# Patient Record
Sex: MALE
[De-identification: names, ages, dates, MRNs, and addresses within clinical notes are randomized per-mention and may not be internally consistent; named-entity substitution may affect disease eponyms.]

---

## 2023-07-17 ENCOUNTER — NON-APPOINTMENT (OUTPATIENT)
Age: 43
End: 2023-07-17

## 2023-07-19 ENCOUNTER — APPOINTMENT (OUTPATIENT)
Dept: DERMATOLOGY | Facility: CLINIC | Age: 43
End: 2023-07-19
Payer: COMMERCIAL

## 2023-07-19 VITALS — HEIGHT: 67 IN | BODY MASS INDEX: 30.29 KG/M2 | WEIGHT: 193 LBS

## 2023-07-19 DIAGNOSIS — D48.5 NEOPLASM OF UNCERTAIN BEHAVIOR OF SKIN: ICD-10-CM

## 2023-07-19 PROBLEM — Z00.00 ENCOUNTER FOR PREVENTIVE HEALTH EXAMINATION: Status: ACTIVE | Noted: 2023-07-19

## 2023-07-19 PROCEDURE — 99204 OFFICE O/P NEW MOD 45 MIN: CPT | Mod: 25

## 2023-07-19 PROCEDURE — 11105 PUNCH BX SKIN EA SEP/ADDL: CPT | Mod: 59

## 2023-07-19 PROCEDURE — 11104 PUNCH BX SKIN SINGLE LESION: CPT

## 2023-07-19 RX ORDER — TRIAMCINOLONE ACETONIDE 1 MG/G
0.1 OINTMENT TOPICAL
Qty: 1 | Refills: 1 | Status: ACTIVE | COMMUNITY
Start: 2023-07-19 | End: 1900-01-01

## 2023-07-19 NOTE — ASSESSMENT
[FreeTextEntry1] : #Dermatitis, new diagnosis with uncertain prognosis \par Ddx: lichen planus vs. other \par Reviewed risks (as well as mitigation strategies for adverse drug events as applicable), benefits, and alternatives of therapy \par - Start triamcinolone 0.1% bid to AA prn itchy rash, side effects discussed. Avoid use on face/groin. Use for up to 2 weeks on and 1 week off. Cycle the medication as needed for flare ups. \par - r/b/a topical steroids were discussed including but not limited to thinning of the skin, atrophy and dyspigmentation. \par - biopsy as below \par \par # Neoplasm of uncertain behavior x2, R axilla and R upper arm \par ddx: lichen planus vs. other \par - Recommend skin biopsy to help confirm diagnosis. See procedure note below\par PROCEDURE:  Punch Biopsy\par METHOD:  Punch\par DESCRIPTION OF PROCEDURE:  After informed consent was obtained, including a discussion of the risk for bleeding, infection, incomplete removal, scarring, and recurrence/persistence, the lesional area was prepped with hibiclens prior to infiltration with 1% lidocaine with epinephrine, 1:100,000 x 1 ml.  After waiting several minutes for epinephrine effect and that adequate anesthesia was achieved, the lesion was biopsied with a 3 mm punch.  Hemostasis was obtained.  There were no complications.  The biopsy was placed in formalin and sent for routine histopathological evaluation. 4-0 prolene sutures were used. Sutures will be removed in 10-14 days. The wound was then dressed with sterile petrolatum and a sterile dressing.  Wound care instructions were provided in verbal and written form including a 24-hour contact number in case of emergency. The patient will be notified by one of the clinical staff (upon return to clinic or by phone) regarding the biopsy results and the need for further treatment.\par \par RTC 2 weeks for suture removal

## 2023-07-19 NOTE — PHYSICAL EXAM
[Alert] : alert [Oriented x 3] : ~L oriented x 3 [Well Nourished] : well nourished [Conjunctiva Non-injected] : conjunctiva non-injected [No Visual Lymphadenopathy] : no visual  lymphadenopathy [No Clubbing] : no clubbing [No Edema] : no edema [No Bromhidrosis] : no bromhidrosis [No Chromhidrosis] : no chromhidrosis [FreeTextEntry3] : Purple scaly papules over axilla, arms, palms bilaterally, and lower back

## 2023-07-19 NOTE — HISTORY OF PRESENT ILLNESS
[FreeTextEntry1] : NV rash [de-identified] : DELONTE MARES is a 42 year old male presenting for:\par \par 1. painful rash started over the axilla and spread to the arms and hands for the past month. Not using treatment. No recent illnesses, new products/fragrances, or travel. No sick contacts. \par \par Derm Hx: no hx skin cancer \par Family Hx: no hx skin cancer \par \par No medical conditions and not on any medications.

## 2023-07-26 ENCOUNTER — NON-APPOINTMENT (OUTPATIENT)
Age: 43
End: 2023-07-26

## 2023-07-26 LAB — DERMATOLOGY BIOPSY: NORMAL

## 2023-08-02 ENCOUNTER — APPOINTMENT (OUTPATIENT)
Dept: DERMATOLOGY | Facility: CLINIC | Age: 43
End: 2023-08-02
Payer: COMMERCIAL

## 2023-08-02 PROCEDURE — 99212 OFFICE O/P EST SF 10 MIN: CPT

## 2023-08-13 NOTE — HISTORY OF PRESENT ILLNESS
[FreeTextEntry1] : F/u LP [de-identified] : 42-year-old male presenting today after biopsies on 7/19/2023 at the right upper arm and right axilla, consistent with lichen planus. Patient denies pain, pruritus, or drainage from biopsy sites.  Started Prednisone 60 mg PO daily x 1 week on 7/26. He has 1 day left of 60 mg. Remainder of taper includes Prednisone 40 mg qAM x 1 week followed by Prednisone 20 mg qAM x 1 week.  He has been taking 20 mg tablets TID- breakfast, lunch, and dinner. Reports insomnia over the past week. Rash is improving. No oral involvement. No pain, no pruritus.    History  7/19/2023: painful rash started over the axilla and spread to the arms and hands for the past month. Not using treatment. No recent illnesses, new products/fragrances, or travel. No sick contacts.   Derm Hx: no hx skin cancer  Family Hx: no hx skin cancer   No medical conditions and not on any medications.

## 2023-08-13 NOTE — PHYSICAL EXAM
[Alert] : alert [Oriented x 3] : ~L oriented x 3 [Well Nourished] : well nourished [Conjunctiva Non-injected] : conjunctiva non-injected [No Visual Lymphadenopathy] : no visual  lymphadenopathy [No Clubbing] : no clubbing [No Edema] : no edema [No Bromhidrosis] : no bromhidrosis [No Chromhidrosis] : no chromhidrosis [FreeTextEntry3] : Focused physical exam with relevant aspects of exam performed today. Brown, hyperpigmented scaly macules in various stages of healing over axilla, arms, palms, and lower back.   Well-healed scars at right bicep and right axilla without erythema, warmth, or dehiscence.  Non-tender to palpation. No drainage.

## 2023-08-13 NOTE — ASSESSMENT
[FreeTextEntry1] : 1. Lichen planus, flaring at axillae, arms, palms, lower back Improving with Prednisone taper and triamcinolone 0.1% ointment.  S/p punch biopsies at R axilla and R upper arm  - Education, counseling. - Continue Prednisone taper as prescribed by Dr. Fuentes but recommend Patient take all Prednisone tablets qAM instead of TID. Side effects of Prednisone discussed.  C/w remaining two days of Prednisone 60 mg followed by Prednisone 40 mg qAM x 1 week followed by Prednisone 20 mg qAM x 1 week.  - C/w triamcinolone 0.1% bid to AA prn itchy rash, side effects discussed. Avoid use on face/groin. SED including atrophy, dyspigmentation, telangiectasias, striae. Proper use reviewed including only using to affected area and avoidance of prolonged use.  2. Suture removal There are no signs or symptoms of infection or dehiscence. There is no evidence of bleeding or hematoma formation. Sutures were removed. Pathology was reviewed with patient and patient was given wound care instructions.  RTC PRN to see Dr. Fuentes.

## 2023-09-13 ENCOUNTER — APPOINTMENT (OUTPATIENT)
Dept: DERMATOLOGY | Facility: CLINIC | Age: 43
End: 2023-09-13
Payer: COMMERCIAL

## 2023-09-13 DIAGNOSIS — D48.5 NEOPLASM OF UNCERTAIN BEHAVIOR OF SKIN: ICD-10-CM

## 2023-09-13 PROCEDURE — 96372 THER/PROPH/DIAG INJ SC/IM: CPT

## 2023-09-13 PROCEDURE — 99214 OFFICE O/P EST MOD 30 MIN: CPT | Mod: 25

## 2023-10-14 ENCOUNTER — NON-APPOINTMENT (OUTPATIENT)
Age: 43
End: 2023-10-14

## 2023-10-16 ENCOUNTER — NON-APPOINTMENT (OUTPATIENT)
Age: 43
End: 2023-10-16

## 2023-11-08 ENCOUNTER — APPOINTMENT (OUTPATIENT)
Dept: DERMATOLOGY | Facility: CLINIC | Age: 43
End: 2023-11-08
Payer: COMMERCIAL

## 2023-11-08 PROCEDURE — 96401 CHEMO ANTI-NEOPL SQ/IM: CPT

## 2023-11-08 PROCEDURE — 99214 OFFICE O/P EST MOD 30 MIN: CPT | Mod: 25

## 2023-12-21 ENCOUNTER — APPOINTMENT (OUTPATIENT)
Dept: DERMATOLOGY | Facility: CLINIC | Age: 43
End: 2023-12-21
Payer: COMMERCIAL

## 2023-12-21 DIAGNOSIS — L29.9 PRURITUS, UNSPECIFIED: ICD-10-CM

## 2023-12-21 PROCEDURE — 99214 OFFICE O/P EST MOD 30 MIN: CPT

## 2023-12-21 RX ORDER — DUPILUMAB 300 MG/2ML
300 INJECTION, SOLUTION SUBCUTANEOUS
Qty: 1 | Refills: 5 | Status: ACTIVE | COMMUNITY
Start: 2023-11-08 | End: 1900-01-01

## 2023-12-21 RX ORDER — PREDNISONE 20 MG/1
20 TABLET ORAL
Qty: 42 | Refills: 0 | Status: ACTIVE | COMMUNITY
Start: 2023-07-26 | End: 1900-01-01

## 2023-12-21 NOTE — PHYSICAL EXAM
[FreeTextEntry3] : AAOx3, pleasant, NAD, no visual lymphadenopathy hair, scalp, face, nose, eyelids, ears, lips, oropharynx, neck, chest, abdomen, back, right arm, left arm, nails, and hands examined with all normal findings, pertinent findings include:  purple papules on wrist, back, legs; diffuse xerosis 40% BSA flaring from prior visit

## 2023-12-21 NOTE — ASSESSMENT
[FreeTextEntry1] : atopic derm, lichen planus failed topical triamcinolone, betamethasone, tacrolimus; SED flaring today  will repeat prednisone 3 week taper, 60 mg x1 week, 40 mg x1 week, 20 mg x1 week; SED c/w Dupixent  F/U in 6 weeks and determine best next course whether to c/w Dupixent or switch to other treatment. SED of Dupixent May also consider Apremilast vs phototherapy in future  unable to continue with prednisone given recurrent courses required and weaning response  f/u 6 weeks  SCORAD 58.75

## 2023-12-21 NOTE — HISTORY OF PRESENT ILLNESS
[FreeTextEntry1] : f/u lichen planus, eczema [de-identified] : 43  year old female/ f/u lichen planus and atopic derm. has done well with prednisone in past then flared when stopped.. LV 11/8 started dupixent loading and has continued 300mg every other week.  Endorses not noting any improvement with dupixent, has had 2 doses of maintenance thus far  Not currently using any topicals

## 2024-01-24 ENCOUNTER — APPOINTMENT (OUTPATIENT)
Dept: DERMATOLOGY | Facility: CLINIC | Age: 44
End: 2024-01-24
Payer: COMMERCIAL

## 2024-01-24 PROCEDURE — 99214 OFFICE O/P EST MOD 30 MIN: CPT

## 2024-01-24 NOTE — ASSESSMENT
[FreeTextEntry1] : atopic derm, lichen planus failed topical betamethasone, tacrolimus; SED not at goal c/w Dupixent  F/U in 6 weeks and determine best next course whether to c/w Dupixent or switch to other treatment. SED of Dupixent May also consider Apremilast vs phototherapy in future  unable to continue with prednisone given recurrent courses required and weaning response  add TAC BID PRN; SED  f/u 6 weeks  SCORAD 58.75

## 2024-01-24 NOTE — HISTORY OF PRESENT ILLNESS
[FreeTextEntry1] : f/u lichen planus, eczema [de-identified] : 43  year old female/ f/u lichen planus and atopic derm. has done well with prednisone in past then flared when stopped.. LV 11/8 started dupixent loading and has continued 300mg every other week.  Endorses improvement with dupixent,  Not currently using any topicals

## 2024-03-07 ENCOUNTER — APPOINTMENT (OUTPATIENT)
Dept: DERMATOLOGY | Facility: CLINIC | Age: 44
End: 2024-03-07
Payer: COMMERCIAL

## 2024-03-07 DIAGNOSIS — L81.0 POSTINFLAMMATORY HYPERPIGMENTATION: ICD-10-CM

## 2024-03-07 PROCEDURE — 99214 OFFICE O/P EST MOD 30 MIN: CPT

## 2024-03-07 RX ORDER — TRIAMCINOLONE ACETONIDE 1 MG/G
0.1 CREAM TOPICAL TWICE DAILY
Qty: 1 | Refills: 2 | Status: ACTIVE | COMMUNITY
Start: 2024-01-24 | End: 1900-01-01

## 2024-03-07 NOTE — HISTORY OF PRESENT ILLNESS
[FreeTextEntry1] : f/u lichen planus, eczema [de-identified] : 43-year-old male f/u lichen planus and atopic derm. has done well with prednisone in past then flared when stopped. LV 11/8 started dupixent, continued until last visit. Last dupixent dose mid-Jan 2024.

## 2024-03-07 NOTE — PHYSICAL EXAM
[FreeTextEntry3] : AAOx3, pleasant, NAD, no visual lymphadenopathy hair, scalp, face, nose, eyelids, ears, lips, oropharynx, neck, chest, abdomen, back, right arm, left arm, nails, and hands examined with all normal findings, pertinent findings include:  Purple and brown hyperpigmented smooth macules on the wrist, back, legs

## 2024-05-22 ENCOUNTER — APPOINTMENT (OUTPATIENT)
Dept: DERMATOLOGY | Facility: CLINIC | Age: 44
End: 2024-05-22
Payer: COMMERCIAL

## 2024-05-22 DIAGNOSIS — L30.9 DERMATITIS, UNSPECIFIED: ICD-10-CM

## 2024-05-22 DIAGNOSIS — L20.9 ATOPIC DERMATITIS, UNSPECIFIED: ICD-10-CM

## 2024-05-22 DIAGNOSIS — L43.9 LICHEN PLANUS, UNSPECIFIED: ICD-10-CM

## 2024-05-22 PROCEDURE — 99214 OFFICE O/P EST MOD 30 MIN: CPT | Mod: 25

## 2024-05-22 PROCEDURE — 96372 THER/PROPH/DIAG INJ SC/IM: CPT

## 2024-05-22 NOTE — HISTORY OF PRESENT ILLNESS
[FreeTextEntry1] : f/u lichen planus, eczema [de-identified] : 43  year old female/ f/u lichen planus and atopic derm. has done well with prednisone in past then flared when stopped.. stopped dupixent. topicals PRN.

## 2024-05-22 NOTE — ASSESSMENT
[FreeTextEntry1] : atopic derm, lichen planus failed topical betamethasone, tacrolimus; SED not at goal F/U in 12 weeks  IMK 40 into right deltoid; SED   add TAC BID PRN; SED

## 2024-09-24 ENCOUNTER — NON-APPOINTMENT (OUTPATIENT)
Age: 44
End: 2024-09-24

## 2024-09-25 ENCOUNTER — APPOINTMENT (OUTPATIENT)
Dept: DERMATOLOGY | Facility: CLINIC | Age: 44
End: 2024-09-25
Payer: COMMERCIAL

## 2024-09-25 DIAGNOSIS — L43.9 LICHEN PLANUS, UNSPECIFIED: ICD-10-CM

## 2024-09-25 DIAGNOSIS — L20.9 ATOPIC DERMATITIS, UNSPECIFIED: ICD-10-CM

## 2024-09-25 PROCEDURE — 99214 OFFICE O/P EST MOD 30 MIN: CPT

## 2024-09-25 NOTE — PHYSICAL EXAM
[FreeTextEntry3] : AAOx3, pleasant, NAD, no visual lymphadenopathy hair, scalp, face, nose, eyelids, ears, lips, oropharynx, neck, chest, abdomen, back, right arm, left arm, nails, and hands examined with all normal findings, pertinent findings include:  hyperpigmented papules

## 2024-09-25 NOTE — HISTORY OF PRESENT ILLNESS
[FreeTextEntry1] : f/u lichen planus, eczema [de-identified] : 43  year old female/ f/u lichen planus and atopic derm. has done well with prednisone in past then flared when stopped.. stopped dupixent. topicals PRN.

## 2024-09-25 NOTE — ASSESSMENT
[FreeTextEntry1] : atopic derm, lichen planus failed topical betamethasone, tacrolimus; SED resolving f/u PRN IMK if flares again; SED